# Patient Record
(demographics unavailable — no encounter records)

---

## 2025-06-23 NOTE — HISTORY OF PRESENT ILLNESS
[de-identified] : Initial visit.  She comes in today complaining of clogged left ear. This started about 5-6 years ago and is intermittent.  She has a history of TMJD and wears a nightguard, has Botox injections, and weekly PT. She reports her TMJ is worse on the left side. She had been evaluated previously and given nasal steroid spray which didn't help. She would get occasional crackling in that ear. Chewing gum would alleviate the ear pressure. She had prior audiogram with normal hearing, but pressure was slightly off in the left ear. No recent URI, no pain during air travel. No history of ear problems. She denies smoking or drinking. endorses episodes of autophony and weight loss of 15lbs in the past year but problem had started prior to weight loss.

## 2025-06-23 NOTE — ASSESSMENT
[FreeTextEntry1] : I believe the bulk of her pathology is from TMJD.  Discussed there may be a possibility of eustachian tube dysfunction with normal tympanograms  Discussed possibility of diagnostic myringotomy. If this helps then a ventilation tube may be considered. Cautioned patient the possibility of prolonged TM perforation that may require patch procedure.  She will think about this and call back should she wish to proceed

## 2025-06-23 NOTE — PHYSICAL EXAM
